# Patient Record
Sex: FEMALE | Race: WHITE
[De-identification: names, ages, dates, MRNs, and addresses within clinical notes are randomized per-mention and may not be internally consistent; named-entity substitution may affect disease eponyms.]

---

## 2023-04-08 ENCOUNTER — HOSPITAL ENCOUNTER (EMERGENCY)
Dept: HOSPITAL 75 - ER | Age: 53
LOS: 1 days | Discharge: HOME | End: 2023-04-09
Payer: COMMERCIAL

## 2023-04-08 VITALS — WEIGHT: 222.67 LBS | HEIGHT: 66.93 IN | BODY MASS INDEX: 34.95 KG/M2

## 2023-04-08 DIAGNOSIS — K80.70: Primary | ICD-10-CM

## 2023-04-08 DIAGNOSIS — Z88.5: ICD-10-CM

## 2023-04-08 DIAGNOSIS — F17.290: ICD-10-CM

## 2023-04-08 DIAGNOSIS — R03.0: ICD-10-CM

## 2023-04-08 LAB
ALBUMIN SERPL-MCNC: 4.3 GM/DL (ref 3.2–4.5)
ALP SERPL-CCNC: 84 U/L (ref 40–136)
ALT SERPL-CCNC: 16 U/L (ref 0–55)
APTT BLD: 30 SEC (ref 24–35)
BASOPHILS # BLD AUTO: 0 10^3/UL (ref 0–0.1)
BASOPHILS NFR BLD AUTO: 0 % (ref 0–10)
BILIRUB SERPL-MCNC: 0.3 MG/DL (ref 0.1–1)
BUN/CREAT SERPL: 12
CALCIUM SERPL-MCNC: 9.3 MG/DL (ref 8.5–10.1)
CHLORIDE SERPL-SCNC: 106 MMOL/L (ref 98–107)
CO2 SERPL-SCNC: 24 MMOL/L (ref 21–32)
CREAT SERPL-MCNC: 0.86 MG/DL (ref 0.6–1.3)
EOSINOPHIL # BLD AUTO: 0.1 10^3/UL (ref 0–0.3)
EOSINOPHIL NFR BLD AUTO: 1 % (ref 0–10)
GFR SERPLBLD BASED ON 1.73 SQ M-ARVRAT: 82 ML/MIN
GLUCOSE SERPL-MCNC: 98 MG/DL (ref 70–105)
HCT VFR BLD CALC: 43 % (ref 35–52)
HGB BLD-MCNC: 14.5 G/DL (ref 11.5–16)
INR PPP: 0.9 (ref 0.8–1.4)
LIPASE SERPL-CCNC: 38 U/L (ref 8–78)
LYMPHOCYTES # BLD AUTO: 2.1 10^3/UL (ref 1–4)
LYMPHOCYTES NFR BLD AUTO: 32 % (ref 12–44)
MAGNESIUM SERPL-MCNC: 2.2 MG/DL (ref 1.6–2.4)
MANUAL DIFFERENTIAL PERFORMED BLD QL: NO
MCH RBC QN AUTO: 30 PG (ref 25–34)
MCHC RBC AUTO-ENTMCNC: 34 G/DL (ref 32–36)
MCV RBC AUTO: 88 FL (ref 80–99)
MONOCYTES # BLD AUTO: 0.5 10^3/UL (ref 0–1)
MONOCYTES NFR BLD AUTO: 8 % (ref 0–12)
NEUTROPHILS # BLD AUTO: 4 10^3/UL (ref 1.8–7.8)
NEUTROPHILS NFR BLD AUTO: 59 % (ref 42–75)
PLATELET # BLD: 273 10^3/UL (ref 130–400)
PMV BLD AUTO: 11.1 FL (ref 9–12.2)
POTASSIUM SERPL-SCNC: 3.7 MMOL/L (ref 3.6–5)
PROT SERPL-MCNC: 7.3 GM/DL (ref 6.4–8.2)
PROTHROMBIN TIME: 12.1 SEC (ref 12.2–14.7)
SODIUM SERPL-SCNC: 141 MMOL/L (ref 135–145)
WBC # BLD AUTO: 6.7 10^3/UL (ref 4.3–11)

## 2023-04-08 PROCEDURE — 36415 COLL VENOUS BLD VENIPUNCTURE: CPT

## 2023-04-08 PROCEDURE — 84484 ASSAY OF TROPONIN QUANT: CPT

## 2023-04-08 PROCEDURE — 83690 ASSAY OF LIPASE: CPT

## 2023-04-08 PROCEDURE — 71045 X-RAY EXAM CHEST 1 VIEW: CPT

## 2023-04-08 PROCEDURE — 80053 COMPREHEN METABOLIC PANEL: CPT

## 2023-04-08 PROCEDURE — 93041 RHYTHM ECG TRACING: CPT

## 2023-04-08 PROCEDURE — 85025 COMPLETE CBC W/AUTO DIFF WBC: CPT

## 2023-04-08 PROCEDURE — 85730 THROMBOPLASTIN TIME PARTIAL: CPT

## 2023-04-08 PROCEDURE — 74177 CT ABD & PELVIS W/CONTRAST: CPT

## 2023-04-08 PROCEDURE — 93005 ELECTROCARDIOGRAM TRACING: CPT

## 2023-04-08 PROCEDURE — 83735 ASSAY OF MAGNESIUM: CPT

## 2023-04-08 PROCEDURE — 76705 ECHO EXAM OF ABDOMEN: CPT

## 2023-04-08 PROCEDURE — 85610 PROTHROMBIN TIME: CPT

## 2023-04-08 NOTE — ED CHEST PAIN
General


Chief Complaint:  Chest Pain


Stated Complaint:  CHEST PAIN


Source:  patient


Exam Limitations:  no limitations





History of Present Illness


Date Seen by Provider:  2023


Time Seen by Provider:  18:08


Initial Comments


Patient is a 53-year-old female who presents to the emergency room with a chief 

complaint of midsternal chest pain.  Patient points to the epigastrium and 

states it seems to run from her umbilicus up into her chest.  She describes it 

as both a "squeezing" and "heaviness".  It started approximately an hour and a 

half prior to arrival.  She noticed it when she woke up from a nap.  She is not 

nauseated.  She states she had chickpea salad for lunch earlier in the day.  She

takes no daily medications.  She is not diabetic.  She has had no prior heart 

work-up.  She has no primary care doctor.  She is a smoker, currently vaping.  

She states she does have a family history of coronary artery disease but cannot 

delineate in who secondary to her current agitation and anxiety.  





Denies any other current illnesses.





Initial EKG is reassuring, normal sinus rhythm at 76 bpm with no ectopy or ST 

segment elevation or depression.  Hypertensive in the 160s over 1 teens in her 

left arm, 150s over upper 90s in her right arm.  Abdomen is soft.  No 

reproducible tenderness.


Timing/Duration:  1 hour


Severity/Quality:  severe, aching, other (heaviness)


Location:  central


Radiation:  back


Activities at Onset:  sleep


Prior CP/Workup:  no prior chest pain, no prior cardiac workup


ASA po PTA:  Yes (one baby aspirin)


Associated Symptoms:  shortness of breath





Allergies and Home Medications


Allergies


Coded Allergies:  


     codeine (Verified  Allergy, Unknown, 23)





Patient Home Medication List


Home Medication List Reviewed:  Yes


Dicyclomine HCl (Dicyclomine HCl) 20 Mg Tablet, 20 MG PO QIDACHS


   Prescribed by: ALBINO AYALA on 23 0129


Hydrocodone/Acetaminophen (Hydrocodone-Acetamin 5-325 mg) 5 Mg-325 Mg Tablet, 1 

TAB PO Q6H PRN for PAIN-BREAKTHROUGH


   Prescribed by: ALBINO AYALA on 23 0130


Ondansetron (Ondansetron Odt) 4 Mg Tab.rapdis, 4 MG SL Q8H PRN for 

NAUSEA/VOMITING


   Prescribed by: ALBINO AYALA on 23 0129





Review of Systems


Review of Systems


Constitutional:  see HPI


EENTM:  No Symptoms Reported


Respiratory:  Shortness of Air


Cardiovascular:  Chest Pain


Gastrointestinal:  No Symptoms Reported


Genitourinary:  No Symptoms Reported


Musculoskeletal:  no symptoms reported


Skin:  no symptoms reported





All Other Systems Reviewed


Negative Unless Noted:  Yes





Physical Exam


Vital Signs





Vital Signs - First Documented








 23





 18:05


 


Temp 36.9


 


Pulse 79


 


Resp 22


 


B/P (MAP) 163/118 (133)


 


Pulse Ox 100


 


O2 Delivery Room Air





Capillary Refill :


Height, Weight, BMI


Height: '"


Weight: lbs. oz. kg;  BMI


Method:


General Appearance:  WD/WN, Anxious, Moderate Distress, Obese


HEENT:  PERRL/EOMI


Neck:  Normal Inspection


Respiratory:  Lungs Clear, Normal Breath Sounds, No Accessory Muscle Use, No 

Respiratory Distress


Cardiovascular:  Regular Rate, Rhythm (70's), Normal Peripheral Pulses


Gastrointestinal:  Non Tender, Soft


Extremity:  Normal Capillary Refill, Normal Range of Motion


Neurologic/Psychiatric:  Alert, Oriented x3, No Motor/Sensory Deficits, CNs II-

XII Norm as Tested, Other (very agitated and anxious; agressive with staff; 

belligerant.)


Skin:  Normal Color, Warm/Dry





Progress/Results/Core Measures


Results/Orders


Lab Results





Laboratory Tests








Test


 23


18:13 23


20:30 Range/Units


 


 


White Blood Count


 6.7 


 


 4.3-11.0


10^3/uL


 


Red Blood Count


 4.91 


 


 3.80-5.11


10^6/uL


 


Hemoglobin 14.5   11.5-16.0  g/dL


 


Hematocrit 43   35-52  %


 


Mean Corpuscular Volume 88   80-99  fL


 


Mean Corpuscular Hemoglobin 30   25-34  pg


 


Mean Corpuscular Hemoglobin


Concent 34 


 


 32-36  g/dL





 


Red Cell Distribution Width 12.5   10.0-14.5  %


 


Platelet Count


 273 


 


 130-400


10^3/uL


 


Mean Platelet Volume 11.1   9.0-12.2  fL


 


Immature Granulocyte % (Auto) 0    %


 


Neutrophils (%) (Auto) 59   42-75  %


 


Lymphocytes (%) (Auto) 32   12-44  %


 


Monocytes (%) (Auto) 8   0-12  %


 


Eosinophils (%) (Auto) 1   0-10  %


 


Basophils (%) (Auto) 0   0-10  %


 


Neutrophils # (Auto)


 4.0 


 


 1.8-7.8


10^3/uL


 


Lymphocytes # (Auto)


 2.1 


 


 1.0-4.0


10^3/uL


 


Monocytes # (Auto)


 0.5 


 


 0.0-1.0


10^3/uL


 


Eosinophils # (Auto)


 0.1 


 


 0.0-0.3


10^3/uL


 


Basophils # (Auto)


 0.0 


 


 0.0-0.1


10^3/uL


 


Immature Granulocyte # (Auto)


 0.0 


 


 0.0-0.1


10^3/uL


 


Prothrombin Time 12.1 L  12.2-14.7  SEC


 


INR Comment 0.9   0.8-1.4  


 


Activated Partial


Thromboplast Time 30 


 


 24-35  SEC





 


Sodium Level 141   135-145  MMOL/L


 


Potassium Level 3.7   3.6-5.0  MMOL/L


 


Chloride Level 106     MMOL/L


 


Carbon Dioxide Level 24   21-32  MMOL/L


 


Anion Gap 11   5-14  MMOL/L


 


Blood Urea Nitrogen 10   7-18  MG/DL


 


Creatinine


 0.86 


 


 0.60-1.30


MG/DL


 


Estimat Glomerular Filtration


Rate 82 


 


  





 


BUN/Creatinine Ratio 12    


 


Glucose Level 98     MG/DL


 


Calcium Level 9.3   8.5-10.1  MG/DL


 


Corrected Calcium 9.1   8.5-10.1  MG/DL


 


Magnesium Level 2.2   1.6-2.4  MG/DL


 


Total Bilirubin 0.3   0.1-1.0  MG/DL


 


Aspartate Amino Transf


(AST/SGOT) 16 


 


 5-34  U/L





 


Alanine Aminotransferase


(ALT/SGPT) 16 


 


 0-55  U/L





 


Alkaline Phosphatase 84     U/L


 


Troponin I < 0.028  < 0.028  <0.028  NG/ML


 


Total Protein 7.3   6.4-8.2  GM/DL


 


Albumin 4.3   3.2-4.5  GM/DL


 


Lipase 38   8-78  U/L








My Orders





Orders - ALBINO AYALA MD


Aspirin Chewable Tablet (Baby Aspirin Ch (23 18:12)


Cbc With Automated Diff (23 18:17)


Magnesium (23 18:17)


Chest 1 View, Ap/Pa Only (23 18:17)


Ekg Tracing (23 18:17)


Comprehensive Metabolic Panel (23 18:17)


Protime With Inr (23 18:17)


Partial Thromboplastin Time (23 18:17)


O2 (23 18:17)


Monitor-Rhythm Ecg Trace Only (23 18:17)


Ed Iv/Invasive Line Start (23 18:17)


Lipase (23 18:17)


Troponin I Soledad (23 18:17)


Nitroglycerin 0.4 Mg Btl 25's (Nitrostat (23 18:30)


Antacid  Suspension (Mylanta  Suspension (23 18:30)


Lidocaine 2% Viscous 15 Ml (Xylocaine Vi (23 18:30)


Sucralfate  Tablet (Carafate  Tablet) (23 18:30)


Nitroglycerin 0.4 Mg Btl 25's (Nitrostat (23 18:19)


Ketorolac Injection (Toradol Injection) (23 18:45)


Ketorolac Injection (Toradol Injection) (23 18:40)


Fentanyl  Inj (Sublimaze Injection) (23 19:00)


Troponin I Soledad (23 20:30)


Fentanyl  Inj (Sublimaze Injection) (23 21:30)


Dicyclomine Injection (Bentyl Injection) (23 21:28)


Ct Abdomen/Pelvis W (23 21:50)


Diphenhydramine Injection (Benadryl Inje (23 22:00)


Ns Iv 1000 Ml (Sodium Chloride 0.9%) (23 21:52)


Diphenhydramine Injection (Benadryl Inje (23 21:52)


Iohexol Injection (Omnipaque 350 Mg/Ml 1 (23 22:15)


Sodium Chloride Flush (Catheter Flush Sy (23 22:15)


Ns (Ivpb) (Sodium Chloride 0.9% Ivpb Bag (23 22:15)


Fentanyl  Inj (Sublimaze Injection) (23 23:45)


Ondansetron Injection (Zofran Injectio (23 23:45)


Us Gallbladder [id number] (23 00:33)


Rx-Ondansetron Po (Rx-Zofran Po) (23 01:24)


Rx-Hydrocodone/Apap 5-325 Mg (Rx-Vicodin (23 01:30)





Medications Given in ED





Current Medications








 Medications  Dose


 Ordered  Sig/Maida


 Route  Start Time


 Stop Time Status Last Admin


Dose Admin


 


 Acetaminophen/


 Hydrocodone Bitart  1 ea  Q6H  PRN


 PO  23 01:30


 23 01:48 DC 23 01:44


1 EA


 


 Al Hydrox/Mg


 Hydrox/Simethicone  30 ml  ONCE  ONCE


 PO  23 18:30


 23 18:31 DC 23 18:20


30 ML


 


 Aspirin  81 mg  STK-MED ONCE


 .ROUTE  23 18:12


 23 18:15 DC 23 18:18


243 MG


 


 Diphenhydramine


 HCl  25 mg  ONCE  ONCE


 IVP  23 22:00


 23 22:01 DC 23 22:09


25 MG


 


 Fentanyl Citrate  50 mcg  ONCE  ONCE


 IVP  23 19:00


 23 19:01 DC 23 19:03


50 MCG


 


 Fentanyl Citrate  50 mcg  ONCE  ONCE


 IVP  23 21:30


 23 21:31 DC 23 21:47


50 MCG


 


 Fentanyl Citrate  50 mcg  ONCE  ONCE


 IVP  23 23:45


 23 23:46 DC 23 00:10


50 MCG


 


 Iohexol  100 ml  ONCE  ONCE


 IV  23 22:15


 23 22:16 DC 23 22:18


80 ML


 


 Ketorolac


 Tromethamine  15 mg  ONCE  ONCE


 IVP  23 18:45


 23 18:46 DC 23 18:42


15 MG


 


 Lidocaine HCl  5 ml  ONCE  ONCE


 PO  23 18:30


 23 18:31 DC 23 18:20


5 ML


 


 Nitroglycerin  0.4 mg  AS NEEDED  PRN


 SL  23 18:30


 23 01:48 DC 23 18:23


0.4 MG


 


 Ondansetron HCl  8 mg  ONCE  ONCE


 IVP  23 23:45


 23 23:46 DC 23 00:09


8 MG


 


 Sodium Chloride  10 ml  AS NEEDED  PRN


 IV  23 22:15


 23 01:48 DC 23 22:18


10 ML


 


 Sodium Chloride  100 ml  ONCE  ONCE


 IV  23 22:15


 23 22:16 DC 23 22:18


80 ML


 


 Sucralfate  1 gm  ONCE  ONCE


 PO  23 18:30


 23 18:31 DC 23 18:20


1 GM








Vital Signs/I&O











 23





 18:05 01:45


 


Temp 36.9 


 


Pulse 79 87


 


Resp 22 18


 


B/P (MAP) 163/118 (133) 130/69


 


Pulse Ox 100 98


 


O2 Delivery Room Air Room Air











Progress


Progress Note #1:  


   Time:  18:40


Progress Note


NOtified by patient's nurse, SHANDRA Barton, that the patient received no relief from

nitro x3 and the GI cocktail.  Toradol ordered.  CXR pending.


Progress Note #2:  


   Time:  21:51


Progress Note


Pain returned intensely.  re-ordering fentanyl and adding bentyl.  CT abdomen 

and pelvis with contrast. Benadryl 25mg IV and IVF


Progress Note #3:  


   Time:  01:15


Progress Note


Patient seen and evaluated by me.  Evaluation today includes physical exam, EKG,

CBC, Chem-12, troponin x2, coags, chest x-ray, CT scan abdomen and pelvis with 

IV contrast, gallbladder ultrasound.  Pertinent physical exam findings include 

well-developed well-nourished obese 53-year-old female moderate to severe 

distress secondary to chest and epigastric pain.  Vital signs are stable, 

slightly hypertensive with diastolic just above 110.  Not tachycardic.  Not 

hypoxic.  Heart is regular, lungs are clear.  Abdominal exam initially limited 

secondary to patient agitation however she does complain of epigastric 

tenderness to palpation.  No rebound or involuntary guarding.  Bowel sounds are 

present.  Patient is moving all extremities equally, no lower extremity edema.  

No neurologic deficits noted.





Differential diagnosis based on history and physical exam, acute coronary 

syndrome, pulmonary embolism, perforated viscus, aortic dissection.





Work-up reviewed by me, EKG is normal, no ST segment elevation or depression.  

CBC is reviewed completely normal, chemistry is normal, troponin negative x2 4 

hours from onset of pain.  Coags negative.  Single view chest x-ray reviewed by 

me independently, normal mediastinal silhouette, no effusion, no infiltrate, no 

pneumothorax noted.  Patient is treated initially with baby aspirin, GI cocktail

and sublingual nitro.  She is also given Toradol, fentanyl and Benadryl.  

Multiple doses of fentanyl subsequently alleviated pain as did Zofran alleviate 

nausea.  She is also given IM Bentyl.  CT scan abdomen pelvis was unremarkable 

for any acute intra-abdominal pathology.  Gallbladder ultrasound was obtained 

and demonstrated gallstones with slightly thickened wall.  No pericholecystic 

fluid.  Consideration for acute cholecystitis however patient's labs are 

completely normal and pain was resolved with medication.  Patient was given 

prescriptions for hydrocodone and Zofran and dicyclomine for home.  She was 

referred to Dr. Serrano for further evaluation and work-up and possible 

cholecystectomy.  She was given return precautions to include return of pain, 

fever and vomiting to come back to the emergency room for possible admission for

cholecystectomy.  She is comfortable with the plan of care, pain-free at 

discharge.  All questions are sought and answered.


Initial ECG Impression Date:  2023


Initial ECG Impression Time:  18:09


Initial ECG Rate:  76


Initial ECG Rhythm:  Normal Sinus


Initial ECG Intervals


DE interval 149


QRS 94


QTc 451


Comment


Normal sinus rhythm without ectopy, low voltage lead III and aVL, no ST segment 

elevation or depression is noted.  Normal intervals





Diagnostic Imaging





   Diagonstic Imaging:  Xray


   Plain Films/CT/US/NM/MRI:  chest


Comments


                 [facility].


                                [place], [place]





NAME:   MYRTLE OH


MED REC#:   [id number]


ACCOUNT#:   [id number]


PT STATUS:   REG ER


:   1970


PHYSICIAN:   ALBINO AYALA MD


ADMIT DATE:   23/ER


                                   ***Draft***


Date of Exam:23





CHEST 1 VIEW, AP/PA ONLY








INDICATION: Chest pain.





EXAMINATION: AP view of the chest was obtained.





COMPARISON: No previous study is available for comparison at this


time.





FINDINGS: Heart size and pulmonary vasculature are within normal


limits and the lungs are clear, bilaterally.  





IMPRESSION: Unremarkable chest.   








  Dictated on workstation # [id number]








Dict:   23


Trans:   23


[id number]





Interpreted by:     JANES HOFF MD


Electronically signed by:








   [facility]:  CT


Comments


CT Abdomen per virtual radiologic - No acute findings





Departure


Impression





   Primary Impression:  


   Biliary colic


   Additional Impression:  


   Cholelithiasis


   Qualified Codes:  K80.20 - Calculus of gallbladder without cholecystitis 

   without obstruction


Disposition:   HOME, SELF-CARE


Condition:  Improved





Departure-Patient Inst.


Decision time for Depature:  01:26


Referrals:  


JONEL SERRANO DO


Patient Instructions:  Gallbladder Diet, Gallstones ED





Add. Discharge Instructions:  


Try and avoid fatty foods if possible.  Avoid heavy cream-based foods, lots of 

cheeses, fried foods etc.





Take dicyclomine 20 mg 30 minutes before eating up to 4 times daily.  This will 

help with gallbladder associated pain.





Ondansetron/Zofran 4 mg tablets every 8 hours as needed for nausea.





Hydrocodone 5 mg tablets every 6 hours as needed for more intense pain.  If you 

develop a fever, pain that is not relieved with prescription medications, 

excessive nausea and vomiting or any other emergent, concerning symptoms please 

return to the emergency room for reevaluation.





Please call Dr. Serrano's office on Monday for a follow-up appointment next week 

for further evaluation of your gallbladder and possible removal.


Scripts


Hydrocodone/Acetaminophen (Hydrocodone-Acetamin 5-325 mg) 5 Mg-325 Mg Tablet


1 TAB PO Q6H PRN for PAIN-BREAKTHROUGH, #15 TAB


   Prov: ALBINO AYALA MD         23 


Ondansetron (Ondansetron Odt) 4 Mg Tab.rapdis


4 MG SL Q8H PRN for NAUSEA/VOMITING, #15 TAB


   Prov: ALBINO AYALA MD         23 


Dicyclomine HCl (Dicyclomine HCl) 20 Mg Tablet


20 MG PO QIDACHS, #60 TAB


   Prov: ALBINO AYALA MD         23











ALBINO AYALA MD          2023 18:08

## 2023-04-08 NOTE — DIAGNOSTIC IMAGING REPORT
INDICATION: Chest pain.



EXAMINATION: AP view of the chest was obtained.



COMPARISON: No previous study is available for comparison at this

time.



FINDINGS: Heart size and pulmonary vasculature are within normal

limits and the lungs are clear, bilaterally.  



IMPRESSION: Unremarkable chest.   



Dictated by: 



  Dictated on workstation # [id number]

## 2023-04-09 VITALS — DIASTOLIC BLOOD PRESSURE: 69 MMHG | SYSTOLIC BLOOD PRESSURE: 130 MMHG

## 2023-04-09 NOTE — DIAGNOSTIC IMAGING REPORT
PROCEDURE: CT abdomen and pelvis with contrast.



TECHNIQUE: Multiple contiguous axial images were obtained through

the abdomen and pelvis after administration of intravenous

contrast. Auto Exposure Controls were utilized during the CT exam

to meet ALARA standards for radiation dose reduction. All CT

scans use one or more of the following dose optimizing

techniques: automated exposure control, MA and/or KvP adjustment

based on patient size and exam type or iterative reconstruction.



INDICATION:  Abdominal pain.



FINDINGS: The lung bases are clear. The liver is normal in size.

There is a 1 cm cyst in the right lobe. Gallbladder is mildly

distended. There is no biliary duct dilatation. Spleen is normal.

Pancreas and adrenal glands are unremarkable. Kidneys normal in

appearance. Aorta is nonaneurysmal. Bowel gas pattern is

nonspecific. There is some diverticular disease without evidence

of diverticulitis. No free air. No ascites. No focal inflammatory

changes. Bladder is normal. There is no pelvic mass, adenopathy

or free fluid. There are mild degenerative changes in the spine.



IMPRESSION: Benign hepatic cyst.



Diverticular disease without evidence of diverticulitis.



Mild distention of the gallbladder.



No other acute abnormality in the abdomen or pelvis



Dictated by: 



  Dictated on workstation # [id number]

## 2023-04-09 NOTE — DIAGNOSTIC IMAGING REPORT
PROCEDURE: US Gallbladder.



TECHNIQUE: Multiple real-time grayscale images were obtained over

the right upper quadrant in various projections. Bowel gas limits

evaluation of abdominal structures.



INDICATION:  Epigastric pain with nausea and emesis



Liver is unremarkable in appearance. Multiple echogenic mobile

calculi are seen within the lumen of the gallbladder without

gallbladder wall thickening or pericholecystic fluid. There is no

evidence of biliary ductal dilatation. Pancreas, aorta, inferior

vena cava and right atrium are obscured. No ascites was

documented.



IMPRESSION: Cholelithiasis without other evidence of acute

abnormality on limited study.



Dictated by: 



  Dictated on workstation # [id number]